# Patient Record
Sex: MALE | Race: AMERICAN INDIAN OR ALASKA NATIVE
[De-identification: names, ages, dates, MRNs, and addresses within clinical notes are randomized per-mention and may not be internally consistent; named-entity substitution may affect disease eponyms.]

---

## 2018-01-10 ENCOUNTER — HOSPITAL ENCOUNTER (EMERGENCY)
Dept: HOSPITAL 5 - ED | Age: 48
Discharge: HOME | End: 2018-01-10
Payer: COMMERCIAL

## 2018-01-10 VITALS — DIASTOLIC BLOOD PRESSURE: 100 MMHG | SYSTOLIC BLOOD PRESSURE: 170 MMHG

## 2018-01-10 DIAGNOSIS — M25.511: Primary | ICD-10-CM

## 2018-01-10 DIAGNOSIS — R03.0: ICD-10-CM

## 2018-01-10 PROCEDURE — 99282 EMERGENCY DEPT VISIT SF MDM: CPT

## 2018-01-10 PROCEDURE — 93010 ELECTROCARDIOGRAM REPORT: CPT

## 2018-01-10 PROCEDURE — 93005 ELECTROCARDIOGRAM TRACING: CPT

## 2018-01-10 PROCEDURE — 96372 THER/PROPH/DIAG INJ SC/IM: CPT

## 2018-01-10 NOTE — EMERGENCY DEPARTMENT REPORT
Upper Extremity





- HPI


Chief Complaint: Extremity Injury, Upper


Stated Complaint: RIGHT SHOULDER ACHE


Time Seen by Provider: 01/10/18 13:35


Upper Extremity: Right Shoulder (pain and shoulder joint without any injury.)


Occurred When: 4 Days


Mechanism: Unsure, Other (she reports that he thinks he slept the wrong way and 

the shoulder and he started having pain when he woke up 4 days ago)


Severity: moderate (4/10.  Achy and worse with movement.  Over-the-counter pain 

medication does not help.)


Symptoms: Yes Pain with Movement (right shoulder), Yes Limited Range of 

Movement (right shoulder), No Deformity, No Numbness, No Weakness, No Swelling, 

No Bruising/Ecchymosis, No Laceration or Abrasion


Other History: Patient he reported right shoulder pain that started 4 days ago 

after he woke up.  He denies any trauma to shoulder.  He said this had happened 

in the past and he states he slept the wrong way.  He has a history of high 

blood pressure and said that he was taken off blood pressure medication a while 

ago because he lost weight but now he gained weight back and he is not sure 

what his blood pressure was because he does not take it.  He said he was on 

lisinopril 5 mg in the past.  He denies any headache, nausea or vomiting, chest 

pain or shortness of breath.  Denies any swelling to legs.  Patient reports 

pain is localized to shoulder joint on the right side.  Pain is 4 out of 10 and 

achy.  Worse with movement better with rest.  Over-the-counter pain medication 

does not help.  Denies any back pain or neck pain.  Patient reports that he had 

a primary care physician but with primary care physician retired and he would 

like to be referred to one.





ED Review of Systems


ROS: 


Stated complaint: RIGHT SHOULDER ACHE


Other details as noted in HPI





Comment: All other systems reviewed and negative


Constitutional: no symptoms reported


Respiratory: no symptoms reported


Cardiovascular: denies: chest pain, palpitations, dyspnea on exertion, edema, 

syncope, paroxysmal nocturnal dyspnea


Gastrointestinal: denies: abdominal pain, nausea, vomiting, diarrhea, 

constipation, melena


Genitourinary: denies: urgency, dysuria, frequency, hematuria, discharge


Musculoskeletal: arthralgia.  denies: back pain, joint swelling, myalgia


Skin: denies: rash


Neurological: denies: headache, numbness, paresthesias, confusion, abnormal gait

, vertigo





ED Past Medical Hx





- Past Medical History


Previous Medical History?: No





- Surgical History


Past Surgical History?: No





- Family History


Family history: hypertension





- Social History


Smoking Status: Never Smoker


Substance Use Type: Alcohol





- Medications


Home Medications: 


 Home Medications











 Medication  Instructions  Recorded  Confirmed  Last Taken  Type


 


Lisinopril [Prinivil] 5 mg PO QAM 30 Days #30 tablet 01/10/18  Unknown Rx


 


traMADol [Ultram] 50 mg PO Q6HR PRN 5 Days #20 tablet 01/10/18  Unknown Rx














Upper Extremity Exam





- Exam


General: 


Vital signs noted. No distress. Alert and acting appropriately.


This is a 47-year-old male well-nourished well-developed in no acute distress


Head and Torso: No HEENT Abnormality (normal exam), No Neck Tenderness (no C-

spine tenderness, no muscular tenderness.  Normal exam, negative cervical lymph 

nodes, full range of motion to neck.), No Chest/Lungs Abnormality (no chest 

wall tenderness, lungs sounds clear to auscultate bilaterally, no rhonchi 

wheezes or rales.  Normal work of breathing.  Cardiovascular: S1, S2.  Regular 

rate and rhythm negative murmur.), No Abdominal Tenderness (nontender to 

palpate in all quadrants.  No rigidity or distention.  Normal bowel sounds in 

all quadrants), No Back Tenderness (no vertebral or paraspinal tenderness, 

normal inspection and full range of motion)


Shoulder Exam: Yes Normal Range of Motion in Shoulder (is with full range of 

motion to shoulders but he reports that is very painful when he tries to raise 

his right upper extremity above his head that he has pain in his shoulder joint.

), No Shoulder Tenderness (no effusion), No Clavicle Tenderness, No Shoulder 

Deformity, No AC Joint Tenderness (or deformity)


Arm Exam: No Arm/Humerus Tenderness, No Arm Deformity


Elbow: Yes Normal Range of Motion in Elbow, No Elbow Tenderness, No Elbow 

Deformity


Forearm: No Forearm Tenderness, No Forearm Deformity, No Pain with Pronation, 

No Pain with Supination


Wrist: Yes Normal ROM in Wrist, No Wrist Tenderness, No Wrist Deformity, No 

Snuffbox Tenderness, No Pain with Axial Thumb Compression


Hand: Yes Normal ROM in Digit(s), No Hand Tenderness, No Hand Deformity, No 

Digit Tenderness, No Digit(s) Deformity, No Tendon Dysfunction


CMS Exam: Yes Normal Distal Pulses (+ 2 pulses all extremities.), Yes Normal 

Capillary Refill, Yes Normal Distal Sensation (normal motor movement.), No 

Broken Skin





ED Course


 Vital Signs











  01/10/18





  09:56


 


Temperature 98.5 F


 


Pulse Rate 88


 


Respiratory 18





Rate 


 


Blood Pressure 166/104


 


O2 Sat by Pulse 99





Oximetry 








 Vital Signs











  01/10/18 01/10/18





  09:56 15:45


 


Temperature 98.5 F 


 


Pulse Rate 88 


 


Respiratory 18 





Rate  


 


Blood Pressure 166/104 


 


Blood Pressure  170/100





[Left]  


 


O2 Sat by Pulse 99 





Oximetry  














- Reevaluation(s)


Reevaluation #1: 





01/10/18 15:42


Patient stable, no acute distress.  We will start him back on his lisinopril.





ED Medical Decision Making





- EKG Data


-: EKG Interpreted by Me (read by attending Physician)


EKG shows normal: sinus rhythm (91 BPM)


Rate: normal





- EKG Data


Interpretation: no acute changes, normal EKG





- Medical Decision Making





ED course: He reported that he has right shoulder pain for 4 days with similar 

occurrence in the past.  He denies any trauma he said he woke up and thinks 

that he slept on the wrong side.  EKG reveals normal sinus rhythm without any 

ST abnormality and rate at 91 bpm.  Manual blood pressure is better.  I 

discussed the patient that there are no need for him to have an x-ray because 

he does not have any broken bones from my physical findings.  I told him he 

might have a rotator cuff injury and will need to follow up with orthopedic 

doctor for MRI.  Patient neck and back exam is normal and he is neurologically 

intact.  Patient given Toradol 60 mg IM in emergency room for pain.  I 

discussed with him that I will refer him to primary care physician that he 

should call and schedule an appointment for initial physical exam and while he 

waited for his exam E should keep daily log of his blood pressure and take to 

primary care visit with him.  Patient was understanding of treatment plan, 

discharge instruction in diagnosis and discharged home in stable condition with 

prescription for Ultram and lisinopril.


Critical care attestation.: 


If time is entered above; I have spent that time in minutes in the direct care 

of this critically ill patient, excluding procedure time.








ED Disposition


Clinical Impression: 


 Arthralgia of right shoulder region, Elevated blood pressure reading with 

diagnosis of hypertension





Disposition: DC-01 TO HOME OR SELFCARE


Is pt being admited?: No


Does the pt Need Aspirin: No


Condition: Stable


Instructions:  Hypertension (ED), Arthralgia (ED)


Additional Instructions: 


Please follow up with Primary care Physician as discussed


Keep log of your blood pressure and take to appointment


Follow up with orthopedist for Shoulder pain


Take blood pressure medication as prescribed


Please do not drive or operate heavy machinery while taking Ultram as this 

medication causes drowsiness


Prescriptions: 


Lisinopril [Prinivil] 5 mg PO QAM 30 Days #30 tablet


traMADol [Ultram] 50 mg PO Q6HR PRN 5 Days #20 tablet


 PRN Reason: Pain


Referrals: 


MORELIA PUENTES MD [Staff Physician] - 01/12/18


[facility] [Outside] - 01/12/18


HAMZAH PHILLIPS MD [Staff Physician] - 01/12/18


Forms:  Accompanied Note, Work/School Release Form(ED)

## 2023-08-25 ENCOUNTER — APPOINTMENT (RX ONLY)
Dept: URBAN - METROPOLITAN AREA CLINIC 33 | Facility: CLINIC | Age: 53
Setting detail: DERMATOLOGY
End: 2023-08-25

## 2023-08-25 DIAGNOSIS — L43.8 OTHER LICHEN PLANUS: ICD-10-CM

## 2023-08-25 PROCEDURE — 11900 INJECT SKIN LESIONS </W 7: CPT

## 2023-08-25 PROCEDURE — ? INTRALESIONAL KENALOG

## 2023-08-25 PROCEDURE — ? PHOTO-DOCUMENTATION

## 2023-08-25 PROCEDURE — ? PRESCRIPTION

## 2023-08-25 PROCEDURE — 99203 OFFICE O/P NEW LOW 30 MIN: CPT | Mod: 25

## 2023-08-25 PROCEDURE — ? TREATMENT REGIMEN

## 2023-08-25 PROCEDURE — ? COUNSELING

## 2023-08-25 PROCEDURE — ? SEPARATE AND IDENTIFIABLE DOCUMENTATION

## 2023-08-25 RX ORDER — CLOBETASOL PROPIONATE 0.5 MG/G
OINTMENT TOPICAL
Qty: 60 | Refills: 0 | Status: ERX | COMMUNITY
Start: 2023-08-25

## 2023-08-25 RX ADMIN — CLOBETASOL PROPIONATE: 0.5 OINTMENT TOPICAL at 00:00

## 2023-08-25 ASSESSMENT — LOCATION ZONE DERM
LOCATION ZONE: ARM
LOCATION ZONE: LEG

## 2023-08-25 ASSESSMENT — LOCATION DETAILED DESCRIPTION DERM
LOCATION DETAILED: LEFT PROXIMAL PRETIBIAL REGION
LOCATION DETAILED: LEFT VENTRAL DISTAL FOREARM
LOCATION DETAILED: RIGHT PROXIMAL PRETIBIAL REGION

## 2023-08-25 ASSESSMENT — LOCATION SIMPLE DESCRIPTION DERM
LOCATION SIMPLE: LEFT PRETIBIAL REGION
LOCATION SIMPLE: RIGHT PRETIBIAL REGION
LOCATION SIMPLE: LEFT FOREARM

## 2023-08-25 NOTE — HPI: RASH
What Type Of Note Output Would You Prefer (Optional)?: Bullet Format
How Severe Is Your Rash?: mild
Is This A New Presentation, Or A Follow-Up?: Rash
Additional History: Patient is here today for a rash on lower legs. Patient states he has a hx of lichen planus that started about 18 years ago. Patient used clobetasol ointment 0.05% in the past to treat. Patient  has also been inject with a steroid which patient states helped quickly.Patient states that the ointment helped with the itching and the reduction of flares. Patient states that he started to flare up again in the past 5 yrs and has been coming and going since then. He does not take any medications.\\n\\nPatient reports no itching today but experiences itching after shower (3/10)

## 2023-08-25 NOTE — PROCEDURE: INTRALESIONAL KENALOG
Detail Level: Zone
Total Volume (Ccs): 0.7
Concentration Of Kenalog Solution Injected (Mg/Ml): 10.0
Require Ndc Code?: No
Expiration Date For Kenalog (Optional): Aug 2024
How Many Mls Were Removed From The 10 Mg/Ml (5ml) Vial When Preparing The Injectable Solution?: 0
Consent: The risks of atrophy were reviewed with the patient.
Administered By (Optional): Nik Harrell PA-C
Kenalog Preparation: Kenalog
Lot # For Kenalog (Optional): [id number]
Kenalog Type Of Vial: Multiple Dose
Medical Necessity Clause: This procedure was medically necessary because the lesions that were treated were:
Ndc# For Kenalog Only: 2864-6016-68
Validate Note Data When Using Inventory: Yes
Show Inventory Tab: Hide

## 2023-08-25 NOTE — PROCEDURE: TREATMENT REGIMEN
Initiate Treatment: Clobetasol ointment
Plan: Patient prefers to treat topically and with ILK injections at this time.
Detail Level: Zone

## 2023-10-13 ENCOUNTER — APPOINTMENT (RX ONLY)
Dept: URBAN - METROPOLITAN AREA CLINIC 33 | Facility: CLINIC | Age: 53
Setting detail: DERMATOLOGY
End: 2023-10-13

## 2023-10-13 DIAGNOSIS — L43.8 OTHER LICHEN PLANUS: ICD-10-CM | Status: IMPROVED

## 2023-10-13 PROCEDURE — ? PRESCRIPTION

## 2023-10-13 PROCEDURE — ? SEPARATE AND IDENTIFIABLE DOCUMENTATION

## 2023-10-13 PROCEDURE — ? INTRALESIONAL KENALOG

## 2023-10-13 PROCEDURE — 11900 INJECT SKIN LESIONS </W 7: CPT

## 2023-10-13 PROCEDURE — 99213 OFFICE O/P EST LOW 20 MIN: CPT | Mod: 25

## 2023-10-13 PROCEDURE — ? COUNSELING

## 2023-10-13 PROCEDURE — ? TREATMENT REGIMEN

## 2023-10-13 PROCEDURE — ? PHOTO-DOCUMENTATION

## 2023-10-13 RX ORDER — CLOBETASOL PROPIONATE 0.5 MG/G
OINTMENT TOPICAL
Qty: 60 | Refills: 1 | Status: ERX

## 2023-10-13 RX ORDER — TACROLIMUS 1 MG/G
OINTMENT TOPICAL BID
Qty: 30 | Refills: 2 | Status: ERX | COMMUNITY
Start: 2023-10-13

## 2023-10-13 RX ADMIN — TACROLIMUS: 1 OINTMENT TOPICAL at 00:00

## 2023-10-13 ASSESSMENT — LOCATION DETAILED DESCRIPTION DERM
LOCATION DETAILED: RIGHT PROXIMAL PRETIBIAL REGION
LOCATION DETAILED: LEFT VENTRAL DISTAL FOREARM
LOCATION DETAILED: LEFT DISTAL CALF
LOCATION DETAILED: LEFT LATERAL PROXIMAL CALF
LOCATION DETAILED: LEFT PROXIMAL PRETIBIAL REGION
LOCATION DETAILED: RIGHT DISTAL LATERAL CALF

## 2023-10-13 ASSESSMENT — LOCATION ZONE DERM
LOCATION ZONE: LEG
LOCATION ZONE: ARM

## 2023-10-13 ASSESSMENT — LOCATION SIMPLE DESCRIPTION DERM
LOCATION SIMPLE: LEFT LOWER LEG
LOCATION SIMPLE: RIGHT CALF
LOCATION SIMPLE: LEFT PRETIBIAL REGION
LOCATION SIMPLE: LEFT CALF
LOCATION SIMPLE: LEFT FOREARM
LOCATION SIMPLE: RIGHT PRETIBIAL REGION

## 2023-10-13 NOTE — PROCEDURE: INTRALESIONAL KENALOG
Detail Level: Detailed
X Size Of Lesion In Cm (Optional): 0
Concentration Of Kenalog Solution Injected (Mg/Ml): 10.0
Total Volume (Ccs): 0.2
Show Inventory Tab: Hide
Include Z78.9 (Other Specified Conditions Influencing Health Status) As An Associated Diagnosis?: No
Consent: The risks of atrophy were reviewed with the patient.
Expiration Date For Kenalog (Optional): 08/31/2024
Kenalog Type Of Vial: Multiple Dose
Validate Note Data When Using Inventory: Yes
Lot # For Kenalog (Optional): [id number]
Medical Necessity Clause: This procedure was medically necessary because the lesions that were treated were:
Treatment Number (Optional): 2
Administered By (Optional): Nik Harrell PA-C
Ndc# For Kenalog Only: 5300-3063-80
Kenalog Preparation: Kenalog

## 2024-05-03 ENCOUNTER — APPOINTMENT (RX ONLY)
Dept: URBAN - METROPOLITAN AREA CLINIC 33 | Facility: CLINIC | Age: 54
Setting detail: DERMATOLOGY
End: 2024-05-03

## 2024-05-03 ENCOUNTER — RX ONLY (OUTPATIENT)
Age: 54
Setting detail: RX ONLY
End: 2024-05-03

## 2024-05-03 DIAGNOSIS — L43.8 OTHER LICHEN PLANUS: ICD-10-CM | Status: IMPROVED

## 2024-05-03 PROCEDURE — 11900 INJECT SKIN LESIONS </W 7: CPT

## 2024-05-03 PROCEDURE — ? PRESCRIPTION MEDICATION MANAGEMENT

## 2024-05-03 PROCEDURE — ? PHOTO-DOCUMENTATION

## 2024-05-03 PROCEDURE — ? COUNSELING

## 2024-05-03 PROCEDURE — 99213 OFFICE O/P EST LOW 20 MIN: CPT | Mod: 25

## 2024-05-03 PROCEDURE — ? SEPARATE AND IDENTIFIABLE DOCUMENTATION

## 2024-05-03 PROCEDURE — ? INTRALESIONAL KENALOG

## 2024-05-03 PROCEDURE — ? PRESCRIPTION

## 2024-05-03 RX ORDER — CLOBETASOL PROPIONATE 0.5 MG/G
OINTMENT TOPICAL
Qty: 60 | Refills: 1 | Status: CANCELLED
Stop reason: CLARIF

## 2024-05-03 RX ORDER — CLOBETASOL PROPIONATE 0.5 MG/G
OINTMENT TOPICAL
Qty: 30 | Refills: 1 | Status: ERX

## 2024-05-03 ASSESSMENT — LOCATION DETAILED DESCRIPTION DERM
LOCATION DETAILED: LEFT PROXIMAL PRETIBIAL REGION
LOCATION DETAILED: RIGHT PROXIMAL PRETIBIAL REGION
LOCATION DETAILED: RIGHT DISTAL PRETIBIAL REGION

## 2024-05-03 ASSESSMENT — LOCATION SIMPLE DESCRIPTION DERM
LOCATION SIMPLE: LEFT PRETIBIAL REGION
LOCATION SIMPLE: RIGHT PRETIBIAL REGION

## 2024-05-03 ASSESSMENT — LOCATION ZONE DERM: LOCATION ZONE: LEG

## 2024-05-03 NOTE — PROCEDURE: INTRALESIONAL KENALOG
Include Z78.9 (Other Specified Conditions Influencing Health Status) As An Associated Diagnosis?: No
Kenalog Type Of Vial: Multiple Dose
Expiration Date For Kenalog (Optional): DEC 2025
How Many Mls Were Removed From The 10 Mg/Ml (5ml) Vial When Preparing The Injectable Solution?: 0
Concentration Of Kenalog Solution Injected (Mg/Ml): 10.0
Show Inventory Tab: Hide
Ndc# For Kenalog Only: 1885-0930-97
Kenalog Preparation: Kenalog
Validate Note Data When Using Inventory: Yes
Total Volume (Ccs): 0.05
Detail Level: Detailed
Medical Necessity Clause: This procedure was medically necessary because the lesions that were treated were:
Administered By (Optional): Nik Harrell PA-C
Lot # For Kenalog (Optional): [id number]
Consent: The risks of atrophy were reviewed with the patient.

## 2024-05-03 NOTE — PROCEDURE: PRESCRIPTION MEDICATION MANAGEMENT
Continue Regimen: Clobetasol ointment weekends PRN to active/raised lesions\\nTacrolimus ointment PRN to flat lesions
Detail Level: Simple
Render In Strict Bullet Format?: No

## 2024-07-12 ENCOUNTER — APPOINTMENT (RX ONLY)
Dept: URBAN - METROPOLITAN AREA CLINIC 33 | Facility: CLINIC | Age: 54
Setting detail: DERMATOLOGY
End: 2024-07-12

## 2024-07-12 DIAGNOSIS — L43.8 OTHER LICHEN PLANUS: ICD-10-CM | Status: IMPROVED

## 2024-07-12 DIAGNOSIS — L81.0 POSTINFLAMMATORY HYPERPIGMENTATION: ICD-10-CM

## 2024-07-12 PROCEDURE — ? PHOTO-DOCUMENTATION

## 2024-07-12 PROCEDURE — ? PRESCRIPTION MEDICATION MANAGEMENT

## 2024-07-12 PROCEDURE — ? SUNSCREEN RECOMMENDATIONS

## 2024-07-12 PROCEDURE — ? COUNSELING

## 2024-07-12 PROCEDURE — ? INTRALESIONAL KENALOG

## 2024-07-12 PROCEDURE — 11900 INJECT SKIN LESIONS </W 7: CPT

## 2024-07-12 PROCEDURE — 99212 OFFICE O/P EST SF 10 MIN: CPT | Mod: 25

## 2024-07-12 ASSESSMENT — LOCATION SIMPLE DESCRIPTION DERM
LOCATION SIMPLE: LEFT LOWER LEG
LOCATION SIMPLE: RIGHT LOWER LEG
LOCATION SIMPLE: LEFT PRETIBIAL REGION
LOCATION SIMPLE: RIGHT PRETIBIAL REGION

## 2024-07-12 ASSESSMENT — LOCATION DETAILED DESCRIPTION DERM
LOCATION DETAILED: RIGHT DISTAL PRETIBIAL REGION
LOCATION DETAILED: LEFT LATERAL PROXIMAL CALF
LOCATION DETAILED: LEFT PROXIMAL PRETIBIAL REGION
LOCATION DETAILED: LEFT LATERAL DISTAL CALF
LOCATION DETAILED: RIGHT PROXIMAL LATERAL PRETIBIAL REGION

## 2024-07-12 ASSESSMENT — LOCATION ZONE DERM: LOCATION ZONE: LEG

## 2024-07-12 NOTE — PROCEDURE: INTRALESIONAL KENALOG
Medical Necessity Clause: This procedure was medically necessary because the lesions that were treated were:
Detail Level: Detailed
Lot # For Kenalog (Optional): [id number]
How Many Mls Were Removed From The 80 Mg/Ml (5ml) Vial When Preparing The Injectable Solution?: 0
Administered By (Optional): Nik Harrell PA-C
Include Z78.9 (Other Specified Conditions Influencing Health Status) As An Associated Diagnosis?: No
Kenalog Type Of Vial: Multiple Dose
Consent: The risks of atrophy were reviewed with the patient.
Show Inventory Tab: Hide
Concentration Of Kenalog Solution Injected (Mg/Ml): 10.0
Expiration Date For Kenalog (Optional): 02/2026
Ndc# For Dheeraj Only: [address]-75
Kenalog Preparation: Kenalog
Total Volume (Ccs): 0.1
Validate Note Data When Using Inventory: Yes